# Patient Record
Sex: FEMALE | Race: WHITE | NOT HISPANIC OR LATINO
[De-identification: names, ages, dates, MRNs, and addresses within clinical notes are randomized per-mention and may not be internally consistent; named-entity substitution may affect disease eponyms.]

---

## 2022-04-15 ENCOUNTER — APPOINTMENT (OUTPATIENT)
Dept: ORTHOPEDIC SURGERY | Facility: CLINIC | Age: 52
End: 2022-04-15
Payer: OTHER MISCELLANEOUS

## 2022-04-15 VITALS — BODY MASS INDEX: 35.44 KG/M2 | WEIGHT: 200 LBS | HEIGHT: 63 IN

## 2022-04-15 DIAGNOSIS — Z82.49 FAMILY HISTORY OF ISCHEMIC HEART DISEASE AND OTHER DISEASES OF THE CIRCULATORY SYSTEM: ICD-10-CM

## 2022-04-15 DIAGNOSIS — E11.9 TYPE 2 DIABETES MELLITUS W/OUT COMPLICATIONS: ICD-10-CM

## 2022-04-15 PROBLEM — Z00.00 ENCOUNTER FOR PREVENTIVE HEALTH EXAMINATION: Status: ACTIVE | Noted: 2022-04-15

## 2022-04-15 PROCEDURE — 99072 ADDL SUPL MATRL&STAF TM PHE: CPT

## 2022-04-15 PROCEDURE — 99213 OFFICE O/P EST LOW 20 MIN: CPT

## 2022-04-15 RX ORDER — METFORMIN HYDROCHLORIDE 500 MG/1
500 TABLET, COATED ORAL
Refills: 0 | Status: ACTIVE | COMMUNITY

## 2022-04-15 RX ORDER — DULAGLUTIDE 0.75 MG/.5ML
0.75 INJECTION, SOLUTION SUBCUTANEOUS
Refills: 0 | Status: ACTIVE | COMMUNITY

## 2022-04-15 NOTE — DISCUSSION/SUMMARY
[Medication Risks Reviewed] : Medication risks reviewed [de-identified] : Let this serve as a formal request for authorization PT left shoulder.\par  checked, meds renewed.

## 2022-04-15 NOTE — WORK
[Total] : total [Does not reveal pre-existing condition(s) that may affect treatment/prognosis] : does not reveal pre-existing condition(s) that may affect treatment/prognosis [Cannot return to work because ________] : cannot return to work because [unfilled] [Unknown at this time] : : unknown at this time [Patient] : patient [No Rx restrictions] : No Rx restrictions. [I provided the services listed above] :  I provided the services listed above. [FreeTextEntry1] : guarded

## 2022-04-17 ENCOUNTER — FORM ENCOUNTER (OUTPATIENT)
Age: 52
End: 2022-04-17

## 2022-05-06 ENCOUNTER — APPOINTMENT (OUTPATIENT)
Dept: ORTHOPEDIC SURGERY | Facility: CLINIC | Age: 52
End: 2022-05-06
Payer: OTHER MISCELLANEOUS

## 2022-05-06 VITALS — BODY MASS INDEX: 35.44 KG/M2 | WEIGHT: 200 LBS | HEIGHT: 63 IN

## 2022-05-06 PROCEDURE — 99072 ADDL SUPL MATRL&STAF TM PHE: CPT

## 2022-05-06 PROCEDURE — 99214 OFFICE O/P EST MOD 30 MIN: CPT

## 2022-05-06 NOTE — HISTORY OF PRESENT ILLNESS
[Work related] : work related [9] : 9 [Meds] : meds [] : no [FreeTextEntry1] : left shoulder [FreeTextEntry3] : 1-13-12 [de-identified] : weather

## 2022-05-06 NOTE — PHYSICAL EXAM
[Left] : left shoulder [Sitting] : sitting [Mild] : mild [] : motor and sensory intact distally [TWNoteComboBox7] : active forward flexion 160 degrees [TWNoteComboBox6] : internal rotation L3 [de-identified] : external rotation 60 degrees

## 2022-05-06 NOTE — DISCUSSION/SUMMARY
[Surgical risks reviewed] : Surgical risks reviewed [de-identified] : Please authorize PT program left shoulder. \par  reviewed.\par Frequency of meds renewed.

## 2022-06-05 ENCOUNTER — FORM ENCOUNTER (OUTPATIENT)
Age: 52
End: 2022-06-05

## 2022-06-24 ENCOUNTER — APPOINTMENT (OUTPATIENT)
Dept: ORTHOPEDIC SURGERY | Facility: CLINIC | Age: 52
End: 2022-06-24
Payer: OTHER MISCELLANEOUS

## 2022-06-24 VITALS — BODY MASS INDEX: 35.44 KG/M2 | WEIGHT: 200 LBS | HEIGHT: 63 IN

## 2022-06-24 PROCEDURE — 99072 ADDL SUPL MATRL&STAF TM PHE: CPT

## 2022-06-24 PROCEDURE — 99214 OFFICE O/P EST MOD 30 MIN: CPT

## 2022-06-24 NOTE — WORK
[Total] : total [Does not reveal pre-existing condition(s) that may affect treatment/prognosis] : does not reveal pre-existing condition(s) that may affect treatment/prognosis [Cannot return to work because ________] : cannot return to work because [unfilled] [Unknown at this time] : : unknown at this time [Patient] : patient [Rx may affect patient's ability to return to work, make patient drowsy, or other issue] : Rx may affect patient's ability to return to work, make patient drowsy, or other issue. [I provided the services listed above] :  I provided the services listed above. [FreeTextEntry1] : poor

## 2022-06-24 NOTE — DISCUSSION/SUMMARY
[Surgical risks reviewed] : Surgical risks reviewed [de-identified] : Please authorize PT program left shoulder. \par  reviewed.\par Frequency of meds renewed. \par Attention:  Nurse Reviewer /Medical Director\par \par I am writing this letter as a request for comp authorization for PT program.\par Patient has tried analgesics, non-steroid anti-inflammatory agents, \par hot or cold compresses,injections of corticosteroids, etc)  which in combination or by themselves has not worked.\par Based on my patient's condition, I strongly believe that the Hyaluronic aid injections is medically needed.\par  \par Thank you for your time and consideration.   \par  Gave pt list of medical management pain MDs\par \par \par \par

## 2022-06-24 NOTE — HISTORY OF PRESENT ILLNESS
[Work related] : work related [Sudden] : sudden [Localized] : localized [Physical therapy] : physical therapy [de-identified] : she has not been in PT and lost last PT script and uses Percocet on occasion [9] : 9 [Meds] : meds [] : no [FreeTextEntry1] : left shoulder [FreeTextEntry3] : 1-13-12 [de-identified] : weather

## 2022-06-24 NOTE — PHYSICAL EXAM
[Left] : left shoulder [Sitting] : sitting [Mild] : mild [] : motor and sensory intact distally [TWNoteComboBox7] : active forward flexion 160 degrees [TWNoteComboBox6] : internal rotation L3 [de-identified] : external rotation 60 degrees

## 2022-07-22 ENCOUNTER — APPOINTMENT (OUTPATIENT)
Dept: ORTHOPEDIC SURGERY | Facility: CLINIC | Age: 52
End: 2022-07-22

## 2022-07-22 VITALS — WEIGHT: 200 LBS | BODY MASS INDEX: 35.44 KG/M2 | HEIGHT: 63 IN

## 2022-07-22 PROCEDURE — 99214 OFFICE O/P EST MOD 30 MIN: CPT

## 2022-07-22 PROCEDURE — 99072 ADDL SUPL MATRL&STAF TM PHE: CPT

## 2022-07-22 NOTE — REVIEW OF SYSTEMS
[Joint Pain] : joint pain [Negative] : Heme/Lymph [Joint Swelling] : joint swelling [Joint Stiffness] : joint stiffness

## 2022-07-22 NOTE — DISCUSSION/SUMMARY
[Surgical risks reviewed] : Surgical risks reviewed [de-identified] : Please authorize PT program left shoulder. \par  reviewed.\par Frequency of meds renewed. \par \par \par I am writing this letter as a request for comp authorization for PT program.\par Patient has tried analgesics, non-steroid anti-inflammatory agents, \par hot or cold compresses,injections of corticosteroids, etc)  which in combination or by themselves has not worked.\par \par Thank you for your time and consideration.   \par  Gave pt list of medical management pain MDs\par \par \par \par

## 2022-07-22 NOTE — PHYSICAL EXAM
[Left] : left shoulder [Sitting] : sitting [Mild] : mild [] : no ecchymosis [TWNoteComboBox7] : active forward flexion 160 degrees [TWNoteComboBox6] : internal rotation L3 [de-identified] : external rotation 60 degrees

## 2022-07-22 NOTE — HISTORY OF PRESENT ILLNESS
[Work related] : work related [Sudden] : sudden [Not working due to injury] : Work status: not working due to injury [Localized] : localized [Physical therapy] : physical therapy [9] : 9 [Meds] : meds [de-identified] : she has not been in PT and lost last PT script and uses Percocet on occasion. She has pain with reaching over head, behind back and sleeping at night.  [] : no [FreeTextEntry1] : left shoulder [de-identified] : weather [FreeTextEntry3] : 1-13-12 [de-identified] : in the past

## 2022-08-26 ENCOUNTER — APPOINTMENT (OUTPATIENT)
Dept: ORTHOPEDIC SURGERY | Facility: CLINIC | Age: 52
End: 2022-08-26

## 2022-08-26 VITALS — BODY MASS INDEX: 35.44 KG/M2 | WEIGHT: 200 LBS | HEIGHT: 63 IN

## 2022-08-26 PROCEDURE — 99214 OFFICE O/P EST MOD 30 MIN: CPT

## 2022-08-26 PROCEDURE — 99072 ADDL SUPL MATRL&STAF TM PHE: CPT

## 2022-08-26 NOTE — PHYSICAL EXAM
[Left] : left shoulder [Sitting] : sitting [Mild] : mild [] : no ecchymosis [TWNoteComboBox7] : active forward flexion 160 degrees [TWNoteComboBox6] : internal rotation L3 [de-identified] : external rotation 60 degrees

## 2022-08-26 NOTE — HISTORY OF PRESENT ILLNESS
[Work related] : work related [Sudden] : sudden [Meds] : meds [8] : 8 [Localized] : localized [Tightness] : tightness [Physical therapy] : physical therapy [Not working due to injury] : Work status: not working due to injury [de-identified] : Here for follow up L shoulder. Started PT. Feeling sore. She has pain with reaching over head, behind back and sleeping at night.  [] : no [FreeTextEntry1] : l shoulder [FreeTextEntry3] : 1-13-12

## 2022-08-26 NOTE — DISCUSSION/SUMMARY
[Surgical risks reviewed] : Surgical risks reviewed [de-identified] : Continue PT\par  reviewed.\par Frequency of meds renewed. \par \par \par I am writing this letter as a request for comp authorization for PT program.\par Patient has tried analgesics, non-steroid anti-inflammatory agents, \par hot or cold compresses,injections of corticosteroids, etc)  which in combination or by themselves has not worked.\par \par Thank you for your time and consideration.   \par  Gave pt list of medical management pain MDs\par \par \par \par

## 2022-09-30 ENCOUNTER — APPOINTMENT (OUTPATIENT)
Dept: ORTHOPEDIC SURGERY | Facility: CLINIC | Age: 52
End: 2022-09-30

## 2022-09-30 VITALS — HEIGHT: 63 IN | WEIGHT: 200 LBS | BODY MASS INDEX: 35.44 KG/M2

## 2022-09-30 PROCEDURE — 99214 OFFICE O/P EST MOD 30 MIN: CPT

## 2022-09-30 PROCEDURE — 73030 X-RAY EXAM OF SHOULDER: CPT | Mod: LT

## 2022-09-30 PROCEDURE — 99072 ADDL SUPL MATRL&STAF TM PHE: CPT

## 2022-09-30 NOTE — DISCUSSION/SUMMARY
[Surgical risks reviewed] : Surgical risks reviewed [de-identified] : Continue PT\par  reviewed.\par Frequency of meds renewed. \par \par \par I am writing this letter as a request for comp authorization for PT program.\par Patient has tried analgesics, non-steroid anti-inflammatory agents, \par hot or cold compresses,injections of corticosteroids, etc)  which in combination or by themselves has not worked.\par \par Thank you for your time and consideration.   \par  Gave pt list of medical management pain MDs\par \par \par \par

## 2022-09-30 NOTE — HISTORY OF PRESENT ILLNESS
[Work related] : work related [Sudden] : sudden [Meds] : meds [Not working due to injury] : Work status: not working due to injury [8] : 8 [de-identified] : Here for follow up L shoulder. Started PT. Feeling sore. She has pain with reaching over head, behind back and sleeping at night. Symptoms have slightly increased since PT program.  [] : no [FreeTextEntry1] : l shoulder [FreeTextEntry6] : sore from PT [de-identified] : weather

## 2022-09-30 NOTE — PHYSICAL EXAM
[Left] : left shoulder [Sitting] : sitting [Mild] : mild [] : no ecchymosis [Right] : right shoulder [AC Joint Arthrosis] : AC Joint Arthrosis [TWNoteComboBox7] : active forward flexion 160 degrees [TWNoteComboBox6] : internal rotation L3 [de-identified] : external rotation 60 degrees

## 2022-10-28 ENCOUNTER — APPOINTMENT (OUTPATIENT)
Dept: ORTHOPEDIC SURGERY | Facility: CLINIC | Age: 52
End: 2022-10-28

## 2022-10-28 VITALS — HEIGHT: 63 IN | BODY MASS INDEX: 35.44 KG/M2 | WEIGHT: 200 LBS

## 2022-10-28 PROCEDURE — 99072 ADDL SUPL MATRL&STAF TM PHE: CPT

## 2022-10-28 PROCEDURE — 99214 OFFICE O/P EST MOD 30 MIN: CPT

## 2022-10-28 NOTE — PHYSICAL EXAM
[Left] : left shoulder [Sitting] : sitting [Mild] : mild [] : motor and sensory intact distally [Right] : right shoulder [AC Joint Arthrosis] : AC Joint Arthrosis [TWNoteComboBox7] : active forward flexion 160 degrees [TWNoteComboBox6] : internal rotation L3 [de-identified] : external rotation 60 degrees

## 2022-10-28 NOTE — HISTORY OF PRESENT ILLNESS
[Work related] : work related [4] : 4 [Localized] : localized [Meds] : meds [Physical therapy] : physical therapy [de-identified] : THe patient has persistent symptoms in the left shoulder, doing PT with improvement.  [] : no [FreeTextEntry1] : l shoulder [FreeTextEntry3] : 1-13-12

## 2022-10-28 NOTE — DISCUSSION/SUMMARY
[Surgical risks reviewed] : Surgical risks reviewed [de-identified] : Continue PT\par  reviewed.\par Frequency of meds renewed. \par \par \par I am writing this letter as a request for comp authorization for PT program.\par Patient has tried analgesics, non-steroid anti-inflammatory agents, \par hot or cold compresses,injections of corticosteroids, etc)  which in combination or by themselves has not worked.\par \par Thank you for your time and consideration.   \par  Gave pt list of medical management pain MDs\par \par \par \par

## 2022-11-25 ENCOUNTER — APPOINTMENT (OUTPATIENT)
Dept: ORTHOPEDIC SURGERY | Facility: CLINIC | Age: 52
End: 2022-11-25

## 2022-11-25 VITALS — WEIGHT: 200 LBS | BODY MASS INDEX: 35.44 KG/M2 | HEIGHT: 63 IN

## 2022-11-25 PROCEDURE — 99072 ADDL SUPL MATRL&STAF TM PHE: CPT

## 2022-11-25 PROCEDURE — 99214 OFFICE O/P EST MOD 30 MIN: CPT

## 2022-11-25 NOTE — DISCUSSION/SUMMARY
[Surgical risks reviewed] : Surgical risks reviewed [de-identified] : Continue PT\par  reviewed.\par Frequency of meds renewed. \par \par \par I am writing this letter as a request for comp authorization for PT program.\par Patient has tried analgesics, non-steroid anti-inflammatory agents, \par hot or cold compresses,injections of corticosteroids, etc)  which in combination or by themselves has not worked.\par \par Thank you for your time and consideration.   \par \par \par \par

## 2022-11-25 NOTE — PHYSICAL EXAM
[Left] : left shoulder [Sitting] : sitting [Mild] : mild [AC Joint Arthrosis] : AC Joint Arthrosis [] : no ecchymosis [TWNoteComboBox7] : active forward flexion 160 degrees [TWNoteComboBox6] : internal rotation L3 [de-identified] : external rotation 60 degrees

## 2022-11-25 NOTE — HISTORY OF PRESENT ILLNESS
[Work related] : work related [4] : 4 [Localized] : localized [Meds] : meds [Physical therapy] : physical therapy [Disabled] : Work status: disabled [de-identified] : THe patient has persistent symptoms in the left shoulder, doing PT with improvement. WRI 1/13/12 [] : no [FreeTextEntry1] : l shoulder [FreeTextEntry3] : 1-13-12

## 2022-12-23 ENCOUNTER — APPOINTMENT (OUTPATIENT)
Dept: ORTHOPEDIC SURGERY | Facility: CLINIC | Age: 52
End: 2022-12-23

## 2022-12-23 VITALS — WEIGHT: 200 LBS | BODY MASS INDEX: 35.44 KG/M2 | HEIGHT: 63 IN

## 2022-12-23 PROCEDURE — 99072 ADDL SUPL MATRL&STAF TM PHE: CPT

## 2022-12-23 PROCEDURE — 99214 OFFICE O/P EST MOD 30 MIN: CPT

## 2022-12-23 NOTE — PHYSICAL EXAM
[Left] : left shoulder [Sitting] : sitting [Mild] : mild [] : no ecchymosis [TWNoteComboBox7] : active forward flexion 160 degrees [TWNoteComboBox6] : internal rotation L3 [de-identified] : external rotation 60 degrees

## 2022-12-23 NOTE — DISCUSSION/SUMMARY
[de-identified] : Let this serve as a formal request for authorization PT program\par Please authorize continued meds.\par  reviewed.\par

## 2022-12-23 NOTE — HISTORY OF PRESENT ILLNESS
[Work related] : work related [Sudden] : sudden [8] : 8 [Localized] : localized [Physical therapy] : physical therapy [Not working due to injury] : Work status: not working due to injury [de-identified] : THe patient has persistent symptoms in the left shoulder, doing PT with improvement. WRI 1/13/12 [] : no [FreeTextEntry1] : l shoulder [FreeTextEntry3] : 1-13-12 [FreeTextEntry6] : soreness [de-identified] : weather

## 2023-01-20 ENCOUNTER — APPOINTMENT (OUTPATIENT)
Dept: ORTHOPEDIC SURGERY | Facility: CLINIC | Age: 53
End: 2023-01-20
Payer: OTHER MISCELLANEOUS

## 2023-01-20 VITALS — BODY MASS INDEX: 35.44 KG/M2 | HEIGHT: 63 IN | WEIGHT: 200 LBS

## 2023-01-20 PROCEDURE — 99214 OFFICE O/P EST MOD 30 MIN: CPT

## 2023-01-20 PROCEDURE — 99072 ADDL SUPL MATRL&STAF TM PHE: CPT

## 2023-01-20 NOTE — DISCUSSION/SUMMARY
[de-identified] : Let this serve as a formal request for authorization PT program, MG-2\par ice\par Please authorize continued meds.\par  reviewed.\par she came in to get new mRI script

## 2023-01-20 NOTE — HISTORY OF PRESENT ILLNESS
[Work related] : work related [Sudden] : sudden [Localized] : localized [Rest] : rest [Meds] : meds [Physical therapy] : physical therapy [de-identified] : THe patient has persistent symptoms in the left shoulder, doing PT with improvement, uses meds on occasion [8] : 8 [Not working due to injury] : Work status: not working due to injury [] : yes [FreeTextEntry1] : l shoulder [FreeTextEntry3] : 1-13-12 [FreeTextEntry6] : soreness [de-identified] : weather

## 2023-01-20 NOTE — PHYSICAL EXAM
[Left] : left shoulder [Sitting] : sitting [Mild] : mild [] : motor and sensory intact distally [TWNoteComboBox7] : active forward flexion 160 degrees [TWNoteComboBox4] : passive forward flexion 170 degrees [TWNoteComboBox6] : internal rotation L3 [de-identified] : external rotation 60 degrees

## 2023-01-20 NOTE — WORK
[Total] : total [Does not reveal pre-existing condition(s) that may affect treatment/prognosis] : does not reveal pre-existing condition(s) that may affect treatment/prognosis [Cannot return to work because ________] : cannot return to work because [unfilled] [Unknown at this time] : : unknown at this time [Patient] : patient [Rx may affect patient's ability to return to work, make patient drowsy, or other issue] : Rx may affect patient's ability to return to work, make patient drowsy, or other issue. [I provided the services listed above] :  I provided the services listed above. [FreeTextEntry1] : poor needs more PT

## 2023-02-23 ENCOUNTER — FORM ENCOUNTER (OUTPATIENT)
Age: 53
End: 2023-02-23

## 2023-03-24 ENCOUNTER — APPOINTMENT (OUTPATIENT)
Dept: ORTHOPEDIC SURGERY | Facility: CLINIC | Age: 53
End: 2023-03-24
Payer: OTHER MISCELLANEOUS

## 2023-03-24 VITALS — WEIGHT: 200 LBS | BODY MASS INDEX: 35.44 KG/M2 | HEIGHT: 63 IN

## 2023-03-24 PROCEDURE — 99214 OFFICE O/P EST MOD 30 MIN: CPT

## 2023-03-24 NOTE — DISCUSSION/SUMMARY
[de-identified] : Let this serve as a formal request for authorization PT program, MG-2\par ice\par Please authorize continued meds.\par  reviewed.\par

## 2023-03-24 NOTE — PHYSICAL EXAM
[Left] : left shoulder [Sitting] : sitting [Mild] : mild [] : motor and sensory intact distally [TWNoteComboBox7] : active forward flexion 160 degrees [TWNoteComboBox4] : passive forward flexion 170 degrees [TWNoteComboBox6] : internal rotation 20 degrees [de-identified] : external rotation 60 degrees

## 2023-03-24 NOTE — HISTORY OF PRESENT ILLNESS
[Work related] : work related [Sudden] : sudden [8] : 8 [Localized] : localized [Meds] : meds [Physical therapy] : physical therapy [Not working due to injury] : Work status: not working due to injury [de-identified] : THe patient has persistent symptoms in the left shoulder, doing PT with improvement, uses meds on occasion [Rest] : rest [] : no [FreeTextEntry1] : l shoulder [FreeTextEntry3] : 1-13-12 [FreeTextEntry6] : soreness [de-identified] : weather

## 2023-03-24 NOTE — WORK
[Does not reveal pre-existing condition(s) that may affect treatment/prognosis] : does not reveal pre-existing condition(s) that may affect treatment/prognosis [Cannot return to work because ________] : cannot return to work because [unfilled] [Unknown at this time] : : unknown at this time [Patient] : patient [Rx may affect patient's ability to return to work, make patient drowsy, or other issue] : Rx may affect patient's ability to return to work, make patient drowsy, or other issue. [I provided the services listed above] :  I provided the services listed above. [FreeTextEntry1] : poor needs more PT

## 2023-04-16 ENCOUNTER — FORM ENCOUNTER (OUTPATIENT)
Age: 53
End: 2023-04-16

## 2023-04-20 ENCOUNTER — FORM ENCOUNTER (OUTPATIENT)
Age: 53
End: 2023-04-20

## 2023-05-19 ENCOUNTER — APPOINTMENT (OUTPATIENT)
Dept: ORTHOPEDIC SURGERY | Facility: CLINIC | Age: 53
End: 2023-05-19
Payer: OTHER MISCELLANEOUS

## 2023-05-19 VITALS — HEIGHT: 63 IN | BODY MASS INDEX: 35.44 KG/M2 | WEIGHT: 200 LBS

## 2023-05-19 PROCEDURE — 99214 OFFICE O/P EST MOD 30 MIN: CPT

## 2023-05-19 NOTE — DISCUSSION/SUMMARY
[de-identified] : Let this serve as a formal request for authorization PT program, MG-2\par ice\par Please authorize continued meds.\par  reviewed.\par \par 05/19/2023 \par \par  RE:  ILIANA FAITH \par \par Acct #- [id number] \par \par \par Attention:  Nurse Reviewer /Medical Director\par \par I am writing this letter as a medical necessity for PT program.\par Patient has tried analgesics, non-steroid anti-inflammatory agents, \par hot or cold compresses,injections of corticosteroids, etc)  which in combination or by themselves has not worked.\par Based on my patient's condition, I strongly believe that the PT is medically needed.\par  \par Thank you for your time and consideration.   \par

## 2023-05-19 NOTE — PHYSICAL EXAM
[Left] : left shoulder [Sitting] : sitting [Mild] : mild [] : motor and sensory intact distally [TWNoteComboBox7] : active forward flexion 160 degrees [TWNoteComboBox4] : passive forward flexion 170 degrees [TWNoteComboBox6] : internal rotation 20 degrees [de-identified] : external rotation 60 degrees

## 2023-05-19 NOTE — HISTORY OF PRESENT ILLNESS
[Work related] : work related [Sudden] : sudden [8] : 8 [Meds] : meds [Physical therapy] : physical therapy [Not working due to injury] : Work status: not working due to injury [de-identified] : The patient has persistent symptoms in the left shoulder, wants to resume PT program and left shoulder pain with certain motions, lifting, reaching over head, behind back. Medications are helpful when she takes them.  [] : no [FreeTextEntry1] : l shoulder [FreeTextEntry3] : 1-13-12

## 2023-06-15 ENCOUNTER — FORM ENCOUNTER (OUTPATIENT)
Age: 53
End: 2023-06-15

## 2023-07-14 ENCOUNTER — APPOINTMENT (OUTPATIENT)
Dept: ORTHOPEDIC SURGERY | Facility: CLINIC | Age: 53
End: 2023-07-14
Payer: OTHER MISCELLANEOUS

## 2023-07-14 VITALS — HEIGHT: 63 IN | WEIGHT: 200 LBS | BODY MASS INDEX: 35.44 KG/M2

## 2023-07-14 PROCEDURE — 99214 OFFICE O/P EST MOD 30 MIN: CPT

## 2023-07-14 NOTE — HISTORY OF PRESENT ILLNESS
[Work related] : work related [Sudden] : sudden [9] : 9 [Localized] : localized [Meds] : meds [Physical therapy] : physical therapy [de-identified] : Patient has persistent smptoms in the left shoulder.  [] : no [FreeTextEntry1] : l shoulder [de-identified] : weather

## 2023-07-14 NOTE — DISCUSSION/SUMMARY
[de-identified] : Let this serve as a formal request for authorization PT program\par \par 07/14/2023 \par \par  RE:  ILIANA FAITH \par \par Acct #- [id number] \par \par \par Attention:  Nurse Reviewer /Medical Director\par \par I am writing this letter as a medical necessity for PT program.\par Patient has tried analgesics, non-steroid anti-inflammatory agents, \par hot or cold compresses,injections of corticosteroids, etc)  which in combination or by themselves has not worked.\par Based on my patient's condition, I strongly believe that the PT is medically needed.\par  \par Thank you for your time and consideration.   \par

## 2023-07-14 NOTE — PHYSICAL EXAM
[Left] : left shoulder [Sitting] : sitting [Mild] : mild [] : motor and sensory intact distally [TWNoteComboBox7] : active forward flexion 160 degrees [TWNoteComboBox4] : passive forward flexion 170 degrees [TWNoteComboBox6] : internal rotation 20 degrees [de-identified] : external rotation 60 degrees

## 2023-09-08 ENCOUNTER — APPOINTMENT (OUTPATIENT)
Dept: ORTHOPEDIC SURGERY | Facility: CLINIC | Age: 53
End: 2023-09-08
Payer: OTHER MISCELLANEOUS

## 2023-09-08 VITALS — WEIGHT: 200 LBS | BODY MASS INDEX: 35.44 KG/M2 | HEIGHT: 63 IN

## 2023-09-08 PROCEDURE — 99214 OFFICE O/P EST MOD 30 MIN: CPT

## 2023-09-08 NOTE — HISTORY OF PRESENT ILLNESS
[Work related] : work related [Sudden] : sudden [9] : 9 [Localized] : localized [Physical therapy] : physical therapy [de-identified] : Patient has persistent symptoms in the left shoulder.  Stiffness, pain with reaching over head, behind back, sleeping at night. Takes pain medication to help with ADL's.  [] : no [FreeTextEntry1] : l shoulder [FreeTextEntry3] : 1-13-12

## 2023-09-08 NOTE — PHYSICAL EXAM
[Left] : left shoulder [Sitting] : sitting [Mild] : mild [] : no tenderness at lateral shoulder [TWNoteComboBox7] : active forward flexion 150 degrees [TWNoteComboBox4] : passive forward flexion 170 degrees [TWNoteComboBox6] : internal rotation 20 degrees [de-identified] : external rotation 60 degrees

## 2023-09-08 NOTE — DISCUSSION/SUMMARY
[de-identified] : Let this serve as a formal request for authorization PT program PT helps patient to decrease her pain medication intake, helps to improve her ROM.    09/08/2023    RE:  ILIANA FAITH   Acct #- [id number]    Attention:  Nurse Reviewer /Medical Director  I am writing this letter as a medical necessity for PT program. Patient has tried analgesics, non-steroid anti-inflammatory agents,  hot or cold compresses,injections of corticosteroids, etc)  which in combination or by themselves has not worked. Based on my patient's condition, I strongly believe that the PT is medically needed.   Thank you for your time and consideration.

## 2023-10-06 ENCOUNTER — APPOINTMENT (OUTPATIENT)
Dept: ORTHOPEDIC SURGERY | Facility: CLINIC | Age: 53
End: 2023-10-06
Payer: OTHER MISCELLANEOUS

## 2023-10-06 VITALS — BODY MASS INDEX: 35.44 KG/M2 | HEIGHT: 63 IN | WEIGHT: 200 LBS

## 2023-10-06 PROCEDURE — 99214 OFFICE O/P EST MOD 30 MIN: CPT

## 2023-12-08 ENCOUNTER — APPOINTMENT (OUTPATIENT)
Dept: ORTHOPEDIC SURGERY | Facility: CLINIC | Age: 53
End: 2023-12-08
Payer: OTHER MISCELLANEOUS

## 2023-12-08 VITALS — WEIGHT: 200 LBS | HEIGHT: 63 IN | BODY MASS INDEX: 35.44 KG/M2

## 2023-12-08 PROCEDURE — 99214 OFFICE O/P EST MOD 30 MIN: CPT

## 2024-01-04 ENCOUNTER — NON-APPOINTMENT (OUTPATIENT)
Age: 54
End: 2024-01-04

## 2024-02-02 ENCOUNTER — APPOINTMENT (OUTPATIENT)
Dept: ORTHOPEDIC SURGERY | Facility: CLINIC | Age: 54
End: 2024-02-02
Payer: OTHER MISCELLANEOUS

## 2024-02-02 VITALS — HEIGHT: 63 IN | WEIGHT: 200 LBS | BODY MASS INDEX: 35.44 KG/M2

## 2024-02-02 PROCEDURE — 99214 OFFICE O/P EST MOD 30 MIN: CPT

## 2024-02-02 NOTE — PHYSICAL EXAM
[Left] : left shoulder [Sitting] : sitting [Mild] : mild [] : motor and sensory intact distally [TWNoteComboBox7] : active forward flexion 150 degrees [TWNoteComboBox4] : passive forward flexion 170 degrees [TWNoteComboBox6] : internal rotation 20 degrees [de-identified] : external rotation 40 degrees

## 2024-02-02 NOTE — HISTORY OF PRESENT ILLNESS
[Work related] : work related [Sudden] : sudden [Localized] : localized [Meds] : meds [Heat] : heat [Physical therapy] : physical therapy [de-identified] : Patient has persistent symptoms in the left shoulder, patient has pain with reaching over head, behind back sleeping. PT not nidia approved and feels worse, no injury [7] : 7 [Shooting] : shooting [] : no [FreeTextEntry1] : l shoulder [FreeTextEntry3] : 1-13-12 [de-identified] : weather

## 2024-02-02 NOTE — WORK
[Total (100%)] : total (100%) [Does not reveal pre-existing condition(s) that may affect treatment/prognosis] : does not reveal pre-existing condition(s) that may affect treatment/prognosis [Cannot return to work because ________] : cannot return to work because [unfilled] [Unknown at this time] : : unknown at this time [Patient] : patient [Rx may affect patient's ability to return to work, make patient drowsy, or other issue] : Rx may affect patient's ability to return to work, make patient drowsy, or other issue. [I provided the services listed above] :  I provided the services listed above. [FreeTextEntry1] : poor needs more PT

## 2024-02-02 NOTE — DISCUSSION/SUMMARY
[de-identified] : Let this serve as a formal request for authorization PT program left shoulder.  meds renewed I-stop reviewed, frequency of medication discussed.

## 2024-03-29 ENCOUNTER — APPOINTMENT (OUTPATIENT)
Dept: ORTHOPEDIC SURGERY | Facility: CLINIC | Age: 54
End: 2024-03-29
Payer: OTHER MISCELLANEOUS

## 2024-03-29 VITALS — WEIGHT: 200 LBS | BODY MASS INDEX: 35.44 KG/M2 | HEIGHT: 63 IN

## 2024-03-29 DIAGNOSIS — M25.812 OTHER SPECIFIED JOINT DISORDERS, LEFT SHOULDER: ICD-10-CM

## 2024-03-29 PROCEDURE — 99214 OFFICE O/P EST MOD 30 MIN: CPT

## 2024-03-29 NOTE — PHYSICAL EXAM
[Left] : left shoulder [Sitting] : sitting [Mild] : mild [] : motor and sensory intact distally [TWNoteComboBox7] : active forward flexion 150 degrees [TWNoteComboBox4] : passive forward flexion 170 degrees [TWNoteComboBox6] : internal rotation 20 degrees [de-identified] : external rotation 40 degrees

## 2024-03-29 NOTE — HISTORY OF PRESENT ILLNESS
[Sudden] : sudden [Work related] : work related [Meds] : meds [8] : 8 [de-identified] : Patient has persistent symptoms in the left shoulder, patient has pain with reaching over head, behind back sleeping. PT not nidia approved and feels worse, no injury [] : no [FreeTextEntry3] : 1-13-12 [FreeTextEntry1] : l shoulder [de-identified] : weather

## 2024-03-29 NOTE — WORK
[Total (100%)] : total (100%) [Does not reveal pre-existing condition(s) that may affect treatment/prognosis] : does not reveal pre-existing condition(s) that may affect treatment/prognosis [Unknown at this time] : : unknown at this time [Cannot return to work because ________] : cannot return to work because [unfilled] [Patient] : patient [I provided the services listed above] :  I provided the services listed above. [Rx may affect patient's ability to return to work, make patient drowsy, or other issue] : Rx may affect patient's ability to return to work, make patient drowsy, or other issue. [FreeTextEntry1] : poor needs more PT

## 2024-03-29 NOTE — DISCUSSION/SUMMARY
[de-identified] : Let this serve as a formal request for authorization PT program left shoulder.  meds renewed, she takes less medication when attending PT program, please authorize PT program.  I-stop reviewed, frequency of medication discussed.

## 2024-05-17 ENCOUNTER — APPOINTMENT (OUTPATIENT)
Dept: ORTHOPEDIC SURGERY | Facility: CLINIC | Age: 54
End: 2024-05-17
Payer: OTHER MISCELLANEOUS

## 2024-05-17 VITALS — WEIGHT: 200 LBS | HEIGHT: 63 IN | BODY MASS INDEX: 35.44 KG/M2

## 2024-05-17 DIAGNOSIS — M77.8 OTHER ENTHESOPATHIES, NOT ELSEWHERE CLASSIFIED: ICD-10-CM

## 2024-05-17 PROCEDURE — 99214 OFFICE O/P EST MOD 30 MIN: CPT

## 2024-05-17 RX ORDER — DICLOFENAC SODIUM 75 MG/1
75 TABLET, DELAYED RELEASE ORAL
Qty: 60 | Refills: 0 | Status: ACTIVE | COMMUNITY
Start: 2022-09-30 | End: 1900-01-01

## 2024-05-17 NOTE — HISTORY OF PRESENT ILLNESS
[Work related] : work related [Sudden] : sudden [Localized] : localized [Physical therapy] : physical therapy [de-identified] : Patient has persistent symptoms in the left shoulder, patient has pain with reaching over head, behind back sleeping. PT not nidia approved and feels worse, no injury, uses meds on occasion [8] : 8 [Meds] : meds [] : no [FreeTextEntry1] : l shoulder [FreeTextEntry3] : 1-13-12 [de-identified] : weather [de-identified] : still waiting on PT

## 2024-05-17 NOTE — DISCUSSION/SUMMARY
[de-identified] : Let this serve as a formal request for authorization PT program left shoulder.  meds renewed, she takes less medication when attending PT program, please authorize PT program.  I-stop reviewed, frequency of medication discussed. modify activities  try OTC meds ice as needed try topical lidocaine for pain control reviewed current medications used by this patient home exercises for functional return

## 2024-05-17 NOTE — PHYSICAL EXAM
[Left] : left shoulder [Sitting] : sitting [Mild] : mild [] : motor and sensory intact distally [TWNoteComboBox7] : active forward flexion 150 degrees [TWNoteComboBox4] : passive forward flexion 170 degrees [TWNoteComboBox6] : internal rotation 20 degrees [de-identified] : external rotation 40 degrees

## 2024-06-14 RX ORDER — OXYCODONE AND ACETAMINOPHEN 5; 325 MG/1; MG/1
5-325 TABLET ORAL
Qty: 60 | Refills: 0 | Status: ACTIVE | COMMUNITY
Start: 1900-01-01 | End: 1900-01-01

## 2024-07-12 ENCOUNTER — APPOINTMENT (OUTPATIENT)
Dept: ORTHOPEDIC SURGERY | Facility: CLINIC | Age: 54
End: 2024-07-12

## 2024-08-02 ENCOUNTER — APPOINTMENT (OUTPATIENT)
Dept: ORTHOPEDIC SURGERY | Facility: CLINIC | Age: 54
End: 2024-08-02
Payer: OTHER MISCELLANEOUS

## 2024-08-02 VITALS — BODY MASS INDEX: 35.44 KG/M2 | WEIGHT: 200 LBS | HEIGHT: 63 IN

## 2024-08-02 DIAGNOSIS — M77.8 OTHER ENTHESOPATHIES, NOT ELSEWHERE CLASSIFIED: ICD-10-CM

## 2024-08-02 PROCEDURE — 99214 OFFICE O/P EST MOD 30 MIN: CPT

## 2024-08-02 NOTE — PHYSICAL EXAM
[Left] : left shoulder [Sitting] : sitting [Mild] : mild [] : no swelling [TWNoteComboBox7] : active forward flexion 145 degrees [TWNoteComboBox4] : passive forward flexion 170 degrees [TWNoteComboBox6] : internal rotation 20 degrees [de-identified] : external rotation 40 degrees

## 2024-08-02 NOTE — HISTORY OF PRESENT ILLNESS
[Work related] : work related [Sudden] : sudden [8] : 8 [Localized] : localized [Meds] : meds [de-identified] : Patient has persistent symptoms in the left shoulder, patient has pain with reaching over head, behind back sleeping. PT not been approved and feels worse,uses meds on occasion [] : no [FreeTextEntry1] : l shoulder [FreeTextEntry3] : 1-13-12

## 2024-08-02 NOTE — DISCUSSION/SUMMARY
[de-identified] : Let this serve as a formal request for authorization PT program left shoulder. MG-2 meds renewed, she takes less medication when attending PT program, please authorize PT program.  I-stop reviewed, frequency of medication discussed. modify activities  try OTC meds ice as needed try topical lidocaine for pain control reviewed current medications used by this patient home exercises for functional return

## 2024-11-15 ENCOUNTER — APPOINTMENT (OUTPATIENT)
Dept: ORTHOPEDIC SURGERY | Facility: CLINIC | Age: 54
End: 2024-11-15
Payer: OTHER MISCELLANEOUS

## 2024-11-15 VITALS — BODY MASS INDEX: 35.44 KG/M2 | WEIGHT: 200 LBS | HEIGHT: 63 IN

## 2024-11-15 DIAGNOSIS — M77.8 OTHER ENTHESOPATHIES, NOT ELSEWHERE CLASSIFIED: ICD-10-CM

## 2024-11-15 PROCEDURE — 99214 OFFICE O/P EST MOD 30 MIN: CPT

## 2024-11-19 ENCOUNTER — APPOINTMENT (OUTPATIENT)
Dept: OBGYN | Facility: CLINIC | Age: 54
End: 2024-11-19

## 2025-01-09 RX ORDER — OXYCODONE AND ACETAMINOPHEN 5; 325 MG/1; MG/1
5-325 TABLET ORAL
Qty: 60 | Refills: 0 | Status: ACTIVE | COMMUNITY
Start: 2025-01-09 | End: 1900-01-01

## 2025-03-07 ENCOUNTER — APPOINTMENT (OUTPATIENT)
Dept: ORTHOPEDIC SURGERY | Facility: CLINIC | Age: 55
End: 2025-03-07
Payer: OTHER MISCELLANEOUS

## 2025-03-07 VITALS — HEIGHT: 63 IN | BODY MASS INDEX: 35.44 KG/M2 | WEIGHT: 200 LBS

## 2025-03-07 DIAGNOSIS — M25.812 OTHER SPECIFIED JOINT DISORDERS, LEFT SHOULDER: ICD-10-CM

## 2025-03-07 DIAGNOSIS — M77.8 OTHER ENTHESOPATHIES, NOT ELSEWHERE CLASSIFIED: ICD-10-CM

## 2025-03-07 PROCEDURE — 99213 OFFICE O/P EST LOW 20 MIN: CPT

## 2025-05-30 ENCOUNTER — APPOINTMENT (OUTPATIENT)
Dept: ORTHOPEDIC SURGERY | Facility: CLINIC | Age: 55
End: 2025-05-30
Payer: OTHER MISCELLANEOUS

## 2025-05-30 VITALS — WEIGHT: 200 LBS | HEIGHT: 63 IN | BODY MASS INDEX: 35.44 KG/M2

## 2025-05-30 DIAGNOSIS — M77.8 OTHER ENTHESOPATHIES, NOT ELSEWHERE CLASSIFIED: ICD-10-CM

## 2025-05-30 PROCEDURE — 99204 OFFICE O/P NEW MOD 45 MIN: CPT

## 2025-05-30 RX ORDER — MELOXICAM 15 MG/1
15 TABLET ORAL
Qty: 30 | Refills: 0 | Status: ACTIVE | COMMUNITY
Start: 2025-05-30 | End: 1900-01-01

## 2025-06-17 ENCOUNTER — APPOINTMENT (OUTPATIENT)
Dept: OBGYN | Facility: CLINIC | Age: 55
End: 2025-06-17
Payer: MEDICARE

## 2025-06-17 VITALS
BODY MASS INDEX: 28.35 KG/M2 | WEIGHT: 160 LBS | HEIGHT: 63 IN | SYSTOLIC BLOOD PRESSURE: 122 MMHG | DIASTOLIC BLOOD PRESSURE: 82 MMHG

## 2025-06-17 PROBLEM — Z01.419 ENCOUNTER FOR WELL WOMAN EXAM WITH ROUTINE GYNECOLOGICAL EXAM: Status: ACTIVE | Noted: 2025-06-17

## 2025-06-17 PROCEDURE — G0101: CPT

## 2025-06-17 PROCEDURE — 99386 PREV VISIT NEW AGE 40-64: CPT | Mod: GY

## 2025-06-17 PROCEDURE — 99202 OFFICE O/P NEW SF 15 MIN: CPT | Mod: 25

## 2025-06-18 LAB — HPV HIGH+LOW RISK DNA PNL CVX: NOT DETECTED

## 2025-06-22 LAB — CYTOLOGY CVX/VAG DOC THIN PREP: ABNORMAL

## 2025-06-25 ENCOUNTER — APPOINTMENT (OUTPATIENT)
Dept: MRI IMAGING | Facility: CLINIC | Age: 55
End: 2025-06-25
Payer: OTHER MISCELLANEOUS

## 2025-06-25 PROCEDURE — 73221 MRI JOINT UPR EXTREM W/O DYE: CPT | Mod: LT

## 2025-07-23 ENCOUNTER — APPOINTMENT (OUTPATIENT)
Dept: OBGYN | Facility: CLINIC | Age: 55
End: 2025-07-23

## 2025-09-12 ENCOUNTER — APPOINTMENT (OUTPATIENT)
Dept: ORTHOPEDIC SURGERY | Facility: CLINIC | Age: 55
End: 2025-09-12
Payer: OTHER MISCELLANEOUS

## 2025-09-12 VITALS — HEIGHT: 63 IN | WEIGHT: 160 LBS | BODY MASS INDEX: 28.35 KG/M2

## 2025-09-12 DIAGNOSIS — S43.432D SUPERIOR GLENOID LABRUM LESION OF LEFT SHOULDER, SUBSEQUENT ENCOUNTER: ICD-10-CM

## 2025-09-12 DIAGNOSIS — M25.812 OTHER SPECIFIED JOINT DISORDERS, LEFT SHOULDER: ICD-10-CM

## 2025-09-12 PROCEDURE — 99214 OFFICE O/P EST MOD 30 MIN: CPT
